# Patient Record
(demographics unavailable — no encounter records)

---

## 2024-11-11 NOTE — HISTORY OF PRESENT ILLNESS
[FreeTextEntry1] : 62-year-old postmenopausal presents for routine annual exam.  Patient has multiple medical problems.  She is diabetic hypertensive and hypothyroid.  Patient was previously being seen at [facility].  She denies complaints today and is up-to-date with all her screening tests.  Patient is scheduled for a colonoscopy this month  Postmenopausal [Patient reported mammogram was normal] : Patient reported mammogram was normal [Patient reported PAP Smear was normal] : Patient reported PAP Smear was normal [Patient reported bone density results were abnormal] : Patient reported bone density results were abnormal [Patient reported colonoscopy was normal] : Patient reported colonoscopy was normal [Mammogramdate] : 11/15/2023 [TextBox_19] : BI-RADS 1 [PapSmeardate] : 2022 [BoneDensityDate] : 6/19/2024 [TextBox_37] : Osteopenia [ColonoscopyDate] : 2014 [TextBox_43] : Scheduled for this year

## 2024-11-11 NOTE — PHYSICAL EXAM
[Chaperone Present] : A chaperone was present in the examining room during all aspects of the physical examination [[id number]] : A chaperone was present during the pelvic exam. [Appropriately responsive] : appropriately responsive [Alert] : alert [No Acute Distress] : no acute distress [No Lymphadenopathy] : no lymphadenopathy [Soft] : soft [Non-tender] : non-tender [Non-distended] : non-distended [No HSM] : No HSM [No Lesions] : no lesions [No Mass] : no mass [Oriented x3] : oriented x3 [Examination Of The Breasts] : a normal appearance [No Masses] : no breast masses were palpable [Labia Majora] : normal [Labia Minora] : normal [Normal] : normal [Uterine Adnexae] : normal [FreeTextEntry2] : Ellen Ann [FreeTextEntry5] : Pap today

## 2024-11-11 NOTE — PLAN
[FreeTextEntry1] : Routine annual exam Breast and pelvic exam Pap today Referral for mammo Colonoscopy schedule for this year Patient takes multivitamins including calcium and vitamin D Patient to sign HIPAA form to get paperwork from [employer] All questions answered

## 2024-12-19 NOTE — HISTORY OF PRESENT ILLNESS
[FreeTextEntry1] : follow up [de-identified] : Pt here for f/u visit. Pt has not been exercising. Also her diet has not been consistent. She is very short staffed at work and doing a lot of overtime so not eating welling. No cardiac complaints. She has taken lisinopril but not every day. B/p is still not at goal.

## 2024-12-19 NOTE — PHYSICAL EXAM
[Normal] : normal rate, regular rhythm, normal S1 and S2 and no murmur heard [No Edema] : there was no peripheral edema [No Focal Deficits] : no focal deficits [Normal Affect] : the affect was normal [Normal Insight/Judgement] : insight and judgment were intact

## 2025-01-24 NOTE — ASSESSMENT
[FreeTextEntry1] : ## Lymphocytosis Since June 2023 Generalized Rash (August 2023)-> prednisone x 21 days. Rash resolved 10/16/2023 WBC 11.29 H/H 12.6/38.12 MCV 80.7 No fevers, night sweats, weight loss Extensive blood work including CBC, CMP, LDH, myeloma panel, peripheral blood flow cytometry largely unremarkable except for slightly elevated CRP  patient is here for follow up clinically doing and losing weight intentionally with vegan (Arnaldo's) diet  Repeat labs show normal WBC, minimally elevated lymphocytes, and normal flow cytometry Patient denies of any B symptoms. Reassured patient that her lymphocytosis is most likely reactive and we can just continue to monitor for now   Advised to be up-to-date with age-appropriate cancer screening  Microcytosis No anemia normal hemoglobin electrophoresis  Advised to take b12 since she's following Vegan diet.   Patient had multiple questions which were answered to satisfaction  d/w Dr. April Ríos in 12 months.  CBC, CMP, CRP, Ferritin, iron panel, b12/folate, phillips code LCMS OV few days later

## 2025-01-24 NOTE — CONSULT LETTER
[Dear  ___] : Dear  [unfilled], [Consult Letter:] : I had the pleasure of evaluating your patient, [unfilled]. [Please see my note below.] : Please see my note below. [Consult Closing:] : Thank you very much for allowing me to participate in the care of this patient.  If you have any questions, please do not hesitate to contact me. [Sincerely,] : Sincerely, [FreeTextEntry3] : Julius Chen MD, MPH  of Medicine [facility] at [facility] Attending Physician  Hematology and Medical Oncology [facility]

## 2025-01-24 NOTE — HISTORY OF PRESENT ILLNESS
[de-identified] : Ms. Jennifer Castañeda is 61 years old female with leukocytosis here for consultation, referred by Dr. Meli Nayak  Patient with PMHX of HTN and borderline diabetes (has yet been on medications) who has been seeing Dr. Nayak in the last 10 years and noticed elevated leukocytosis since 2023.   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 VLADISLAV GREENWOOD - never had one.   Vaccinations: can't recall if she had Shingrix or pneumonia vaccines.  Flu annually - 10/2023  Denies being sick or any inflammation in 2023 She went into urgent care for generalized rash and was given Prednisone tapering dose.  [de-identified] : Patient is seen today for follow up  She's doing well, following Daneil's fast (vegan) with her [employer] to lower her sugar. Has not 6 lbs so far.  Denies of any B symptoms.

## 2025-01-24 NOTE — HISTORY OF PRESENT ILLNESS
[de-identified] : Ms. Jennifer Castañeda is 61 years old female with leukocytosis here for consultation, referred by Dr. Meli Nayak  Patient with PMHX of HTN and borderline diabetes (has yet been on medications) who has been seeing Dr. Nayak in the last 10 years and noticed elevated leukocytosis since 2023.   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 VLADISLAV GREENWOOD - never had one.   Vaccinations: can't recall if she had Shingrix or pneumonia vaccines.  Flu annually - 10/2023  Denies being sick or any inflammation in 2023 She went into urgent care for generalized rash and was given Prednisone tapering dose.  [de-identified] : Patient is seen today for follow up  She's doing well, following Daneil's fast (vegan) with her [employer] to lower her sugar. Has not 6 lbs so far.  Denies of any B symptoms.

## 2025-01-24 NOTE — HISTORY OF PRESENT ILLNESS
[de-identified] : Ms. Jennifer Castañeda is 61 years old female with leukocytosis here for consultation, referred by Dr. Meli Nayak  Patient with PMHX of HTN and borderline diabetes (has yet been on medications) who has been seeing Dr. Nayak in the last 10 years and noticed elevated leukocytosis since 2023.   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 VLADISLAV GREENWOOD - never had one.   Vaccinations: can't recall if she had Shingrix or pneumonia vaccines.  Flu annually - 10/2023  Denies being sick or any inflammation in 2023 She went into urgent care for generalized rash and was given Prednisone tapering dose.  [de-identified] : Patient is seen today for follow up  She's doing well, following Daneil's fast (vegan) with her [employer] to lower her sugar. Has not 6 lbs so far.  Denies of any B symptoms.

## 2025-01-24 NOTE — REVIEW OF SYSTEMS
Statement Selected [Negative] : Allergic/Immunologic [FreeTextEntry2] : 10 point review of systems negative except as outlined in HPI [FreeTextEntry9] : mild tenderness on her right elbow

## 2025-01-24 NOTE — REVIEW OF SYSTEMS
[Negative] : Allergic/Immunologic [FreeTextEntry2] : 10 point review of systems negative except as outlined in HPI [FreeTextEntry9] : mild tenderness on her right elbow

## 2025-01-24 NOTE — CONSULT LETTER
[Dear  ___] : Dear  [unfilled], [Consult Letter:] : I had the pleasure of evaluating your patient, [unfilled]. [Please see my note below.] : Please see my note below. [Consult Closing:] : Thank you very much for allowing me to participate in the care of this patient.  If you have any questions, please do not hesitate to contact me. [Sincerely,] : Sincerely, [FreeTextEntry3] : Julius Chen MD, MPH  of Medicine [facility] at [facility] Attending Physician  Hematology and Medical Oncology [facility]

## 2025-01-24 NOTE — CONSULT LETTER
[Dear  ___] : Dear  [unfilled], [Consult Letter:] : I had the pleasure of evaluating your patient, [unfilled]. [Please see my note below.] : Please see my note below. [Consult Closing:] : Thank you very much for allowing me to participate in the care of this patient.  If you have any questions, please do not hesitate to contact me. [Sincerely,] : Sincerely, [FreeTextEntry3] : Julius Chen MD, MPH  of Medicine [facility] at [facility] Attending Physician  Hematology and Medical Oncology [facility]

## 2025-01-24 NOTE — BEGINNING OF VISIT
[0] : 2) Feeling down, depressed, or hopeless: Not at all (0) [PHQ-2 Negative] : PHQ-2 Negative [Pain Scale: ___] : On a scale of 1-10, today the patient's pain is a(n) [unfilled]. [Never] : Never [Date Discussed (MM/DD/YY): ___] : Discussed: [unfilled] [Patient/Caregiver unclear of wishes] : Patient/Caregiver unclear of wishes [Abdominal Pain] : no abdominal pain [Vomiting] : no vomiting [Constipation] : no constipation [Diarrhea Character] : Diarrhea: Grade 0

## 2025-01-24 NOTE — RESULTS/DATA
[FreeTextEntry1] : Labs reviewed, analyzed, and discussed  Peripheral blood, flow cytometric immunophenotyping: Normal immunophenotyping results.  No monotypic B-cell population or increase in blasts identified.

## 2025-05-16 NOTE — PHYSICAL EXAM
[Normal Sclera/Conjunctiva] : normal sclera/conjunctiva [Normal Outer Ear/Nose] : the outer ears and nose were normal in appearance [No Carotid Bruits] : no carotid bruits [Pedal Pulses Present] : the pedal pulses are present [No Edema] : there was no peripheral edema [Normal Appearance] : normal in appearance [No Masses] : no palpable masses [No Nipple Discharge] : no nipple discharge [No Axillary Lymphadenopathy] : no axillary lymphadenopathy [Normal] : soft, non-tender, non-distended, no masses palpated, no HSM and normal bowel sounds [Normal Posterior Cervical Nodes] : no posterior cervical lymphadenopathy [Normal Anterior Cervical Nodes] : no anterior cervical lymphadenopathy [No CVA Tenderness] : no CVA  tenderness [No Spinal Tenderness] : no spinal tenderness [No Joint Swelling] : no joint swelling [Grossly Normal Strength/Tone] : grossly normal strength/tone [No Rash] : no rash [Coordination Grossly Intact] : coordination grossly intact [No Focal Deficits] : no focal deficits [Normal Gait] : normal gait [Normal Affect] : the affect was normal [Normal Insight/Judgement] : insight and judgment were intact

## 2025-05-16 NOTE — HEALTH RISK ASSESSMENT
[Very Good] : ~his/her~  mood as very good [No] : In the past 12 months have you used drugs other than those required for medical reasons? No [No falls in past year] : Patient reported no falls in the past year [0] : 2) Feeling down, depressed, or hopeless: Not at all (0) [PHQ-2 Negative - No further assessment needed] : PHQ-2 Negative - No further assessment needed [Never] : Never [Patient reported mammogram was normal] : Patient reported mammogram was normal [Patient reported PAP Smear was normal] : Patient reported PAP Smear was normal [Patient reported bone density results were normal] : Patient reported bone density results were normal [Patient reported colonoscopy was normal] : Patient reported colonoscopy was normal [With Family] : lives with family [Employed] : employed [] :  [Feels Safe at Home] : Feels safe at home [Fully functional (bathing, dressing, toileting, transferring, walking, feeding)] : Fully functional (bathing, dressing, toileting, transferring, walking, feeding) [Fully functional (using the telephone, shopping, preparing meals, housekeeping, doing laundry, using] : Fully functional and needs no help or supervision to perform IADLs (using the telephone, shopping, preparing meals, housekeeping, doing laundry, using transportation, managing medications and managing finances) [HIV test declined] : HIV test declined [Hepatitis C test declined] : Hepatitis C test declined [None] : None [# Of Children ___] : has [unfilled] children [[id number]] : 0 [Reports changes in hearing] : Reports no changes in hearing [Reports changes in vision] : Reports no changes in vision [Reports changes in dental health] : Reports no changes in dental health [MammogramDate] : 11/23 [PapSmearDate] : 11/24 [PapSmearComments] : referral  for PAP [BoneDensityDate] : 06/24 [BoneDensityComments] : referral given [ColonoscopyDate] : 11/24 [ColonoscopyComments] : Dr Gloria 7-10 years [de-identified] : mom brother and son [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

## 2025-05-16 NOTE — HISTORY OF PRESENT ILLNESS
[FreeTextEntry1] : annual [de-identified] : Pt here for annual physical. She has had a lot of stress with her ill mom over the past month and a half. She did not start on any metformin or statin after last labs. She has not been exercising. No cardiac complaints.

## 2025-05-16 NOTE — ADDENDUM
[FreeTextEntry1] : Patient presented for TDAP (Boostrix) vaccination as ordered by Dr. Gasca.  Prior to administration, reviewed TDAP VIS with patient who verbalized understanding and consent. Patient denies allergic reaction to previous dose of any vaccine. Denied coma or decreased level of consciousness. Denied seizures. Denied Guillain-Forsyth Syndome (GBS). Denied pain or swelling after previous doses of any vaccine.  Manufacture: [employer] NDC:[id number] Exp:07/11/2027 Lot:[id number]   Patient tolerated vaccine well in Left.  No immediate reaction noted.   Patient provided TDAP VIS for home review as per protocol.   Cristina Garcia RN